# Patient Record
Sex: FEMALE | Race: BLACK OR AFRICAN AMERICAN
[De-identification: names, ages, dates, MRNs, and addresses within clinical notes are randomized per-mention and may not be internally consistent; named-entity substitution may affect disease eponyms.]

---

## 2018-12-08 ENCOUNTER — HOSPITAL ENCOUNTER (EMERGENCY)
Dept: HOSPITAL 72 - EMR | Age: 38
Discharge: HOME | End: 2018-12-08
Payer: MEDICAID

## 2018-12-08 VITALS — SYSTOLIC BLOOD PRESSURE: 146 MMHG | DIASTOLIC BLOOD PRESSURE: 96 MMHG

## 2018-12-08 VITALS — WEIGHT: 105 LBS | BODY MASS INDEX: 19.83 KG/M2 | HEIGHT: 61 IN

## 2018-12-08 VITALS — DIASTOLIC BLOOD PRESSURE: 89 MMHG | SYSTOLIC BLOOD PRESSURE: 141 MMHG
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
## 2018-12-08 DIAGNOSIS — S00.81XA: Primary | ICD-10-CM

## 2018-12-08 DIAGNOSIS — M54.2: ICD-10-CM

## 2018-12-08 LAB
APPEARANCE UR: CLEAR
APTT PPP: YELLOW S
GLUCOSE UR STRIP-MCNC: (no result) MG/DL
KETONES UR QL STRIP: NEGATIVE
LEUKOCYTE ESTERASE UR QL STRIP: (no result)
NITRITE UR QL STRIP: NEGATIVE
PH UR STRIP: 5 [PH] (ref 4.5–8)
PROT UR QL STRIP: (no result)
SP GR UR STRIP: 1.02 (ref 1–1.03)
UROBILINOGEN UR-MCNC: 1 MG/DL (ref 0–1)

## 2018-12-08 PROCEDURE — 70450 CT HEAD/BRAIN W/O DYE: CPT

## 2018-12-08 PROCEDURE — 81003 URINALYSIS AUTO W/O SCOPE: CPT

## 2018-12-08 PROCEDURE — 72040 X-RAY EXAM NECK SPINE 2-3 VW: CPT

## 2018-12-08 PROCEDURE — 81025 URINE PREGNANCY TEST: CPT

## 2018-12-08 PROCEDURE — 99284 EMERGENCY DEPT VISIT MOD MDM: CPT

## 2018-12-08 NOTE — DIAGNOSTIC IMAGING REPORT
EXAM:

  XR Cervical Spine,3 Views

 

CLINICAL HISTORY:

  PAIN

 

TECHNIQUE:

  Frontal, lateral, and odontoid views of the cervical spine.

 

COMPARISON:

  No relevant prior studies available.

 

FINDINGS:

  Vertebrae:  Unremarkable. Cervical vertebral body heights are preserved.

 No visible fracture.  Normal alignment. Atlantodens interval remains 

within normal limits.

  Disc spaces:  No significant narrowing.

  Soft tissues:  Unremarkable.

 

IMPRESSION:     

  Unremarkable cervical spine x-rays.

## 2018-12-08 NOTE — DIAGNOSTIC IMAGING REPORT
EXAM:

  CT Head Without Intravenous Contrast

 

CLINICAL HISTORY:

  PAIN

 

TECHNIQUE:

  Axial computed tomography images of the head/brain without intravenous 

contrast.  CTDI is 70.53 mGy and DLP is 1245 mGy-cm.  One or more of the 

following dose reduction techniques were used: automated exposure control,

 adjustment of the mA and/or kV according to patient size, use of 

iterative reconstruction technique.

 

COMPARISON:

  No relevant prior studies available.

 

FINDINGS:

  Brain:  Unremarkable.   No evidence of acute intracranial hemorrhage.  

No significant white matter disease.  No edema.  No mass effect or 

midline shift.

  Ventricles:  Unremarkable.  No ventriculomegaly.

  Bones/joints:  Unremarkable.  No depressed skull fracture.

  Soft tissues:  Unremarkable.

  Sinuses:  Unremarkable as visualized.  No acute sinusitis.

  Mastoid air cells:  Unremarkable as visualized.  No mastoid effusion.

 

IMPRESSION:     

  Unremarkable noncontrast CT of the head/brain.
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
